# Patient Record
Sex: MALE | Race: BLACK OR AFRICAN AMERICAN | NOT HISPANIC OR LATINO | Employment: UNEMPLOYED | URBAN - METROPOLITAN AREA
[De-identification: names, ages, dates, MRNs, and addresses within clinical notes are randomized per-mention and may not be internally consistent; named-entity substitution may affect disease eponyms.]

---

## 2019-09-26 ENCOUNTER — OFFICE VISIT (OUTPATIENT)
Dept: FAMILY MEDICINE | Facility: CLINIC | Age: 24
End: 2019-09-26
Payer: MEDICAID

## 2019-09-26 VITALS
HEART RATE: 70 BPM | DIASTOLIC BLOOD PRESSURE: 82 MMHG | SYSTOLIC BLOOD PRESSURE: 118 MMHG | OXYGEN SATURATION: 98 % | WEIGHT: 262.44 LBS | BODY MASS INDEX: 34.78 KG/M2 | HEIGHT: 73 IN | TEMPERATURE: 98 F

## 2019-09-26 DIAGNOSIS — Z00.00 GENERAL MEDICAL EXAM: Primary | ICD-10-CM

## 2019-09-26 DIAGNOSIS — Z23 IMMUNIZATION DUE: ICD-10-CM

## 2019-09-26 DIAGNOSIS — M22.2X9 PATELLOFEMORAL STRESS SYNDROME, UNSPECIFIED LATERALITY: ICD-10-CM

## 2019-09-26 DIAGNOSIS — J20.8 VIRAL BRONCHITIS: ICD-10-CM

## 2019-09-26 DIAGNOSIS — F41.9 ANXIETY: ICD-10-CM

## 2019-09-26 PROCEDURE — 99213 OFFICE O/P EST LOW 20 MIN: CPT | Mod: PBBFAC,PN | Performed by: FAMILY MEDICINE

## 2019-09-26 PROCEDURE — 99203 OFFICE O/P NEW LOW 30 MIN: CPT | Mod: S$PBB,,, | Performed by: FAMILY MEDICINE

## 2019-09-26 PROCEDURE — 99203 PR OFFICE/OUTPT VISIT, NEW, LEVL III, 30-44 MIN: ICD-10-PCS | Mod: S$PBB,,, | Performed by: FAMILY MEDICINE

## 2019-09-26 PROCEDURE — 99999 PR PBB SHADOW E&M-EST. PATIENT-LVL III: ICD-10-PCS | Mod: PBBFAC,,, | Performed by: FAMILY MEDICINE

## 2019-09-26 PROCEDURE — 99999 PR PBB SHADOW E&M-EST. PATIENT-LVL III: CPT | Mod: PBBFAC,,, | Performed by: FAMILY MEDICINE

## 2019-09-26 RX ORDER — NABUMETONE 500 MG/1
500 TABLET, FILM COATED ORAL 2 TIMES DAILY
Qty: 30 TABLET | Refills: 1 | Status: SHIPPED | OUTPATIENT
Start: 2019-09-26

## 2019-09-26 RX ORDER — ZIPRASIDONE HYDROCHLORIDE 60 MG/1
CAPSULE ORAL
Refills: 2 | COMMUNITY
Start: 2019-08-29

## 2019-09-26 RX ORDER — BUPROPION HYDROCHLORIDE 300 MG/1
300 TABLET ORAL EVERY MORNING
Refills: 2 | COMMUNITY
Start: 2019-09-12

## 2019-09-26 RX ORDER — BENZONATATE 200 MG/1
200 CAPSULE ORAL 3 TIMES DAILY PRN
Qty: 30 CAPSULE | Refills: 1 | Status: SHIPPED | OUTPATIENT
Start: 2019-09-26 | End: 2019-10-06

## 2019-09-26 NOTE — PROGRESS NOTES
Assessment and Plan:    1. Anxiety  Follow-up with Psychiatry as directed  - TSH; Future  - T3, free; Future  - T4, free; Future    2. General medical exam  Patient defers lab work  - CBC auto differential; Future  - Comprehensive metabolic panel; Future  - Lipid panel; Future    3. Immunization due  Patient defers immunizations    4. Viral bronchitis  Recommended Mucinex  - benzonatate (TESSALON) 200 MG capsule; Take 1 capsule (200 mg total) by mouth 3 (three) times daily as needed for Cough.  Dispense: 30 capsule; Refill: 1    5. Patellofemoral stress syndrome, unspecified laterality  Gave sheet with exercises  - nabumetone (RELAFEN) 500 MG tablet; Take 1 tablet (500 mg total) by mouth 2 (two) times daily.  Dispense: 30 tablet; Refill: 1        ______________________________________________________________________  Subjective:    Chief Complaint:  Chief Complaint   Patient presents with    [facility]    Nasal Congestion     Ocurring x4-5 weeks, cough with phelgm production of brownish yellow, fever and chills, scratchy throat, and no runny nose.     Knee Pain     Chronic pain, patient runs a lot. Hurts mainly with activity.         HPI:  Konrad is a 24 y.o. year old     Nasal congestion  Duration 2 weeks.  Associated with cough and phlegm production.  He does report fever and chills as well as scratchy throat.  Denies any running nose.  Multiple people sick in house.     Knee pain  Chronic.  Pain hurts with running. Exam was unremarkable.     Anxiety, bipolar 1 disorder   Prescribed clonazepam 2 mg by mouth 3 times daily by provider in [place], Dr. Jose Martin Ram.  Previous prescription written on 07/31/2019 and filled on 09/03/2019 for 30 day supply. Also taking Wellbutrin.      Health maintenance  Influenza vaccine - defers  Pneumonia vaccine   Tdap       Past Medical History:  Past Medical History:   Diagnosis Date    Anxiety     Bipolar 1 disorder     Depression        Past Surgical  History:  No past surgical history on file.    Family History:  No family history on file.    Social History:  Social History     Socioeconomic History    Marital status:      Spouse name: Not on file    Number of children: Not on file    Years of education: Not on file    Highest education level: Not on file   Occupational History    Not on file   Social Needs    Financial resource strain: Not on file    Food insecurity:     Worry: Not on file     Inability: Not on file    Transportation needs:     Medical: Not on file     Non-medical: Not on file   Tobacco Use    Smoking status: Current Every Day Smoker     Types: Vaping with nicotine    Smokeless tobacco: Never Used   Substance and Sexual Activity    Alcohol use: Yes     Comment: social    Drug use: No    Sexual activity: Not on file   Lifestyle    Physical activity:     Days per week: Not on file     Minutes per session: Not on file    Stress: Not on file   Relationships    Social connections:     Talks on phone: Not on file     Gets together: Not on file     Attends L[employer] day service: Not on file     Active member of club or organization: Not on file     Attends meetings of clubs or organizations: Not on file     Relationship status: Not on file   Other Topics Concern    Not on file   Social History Narrative    Not on file       Medications:  Current Outpatient Medications on File Prior to Visit   Medication Sig Dispense Refill    buPROPion (WELLBUTRIN XL) 300 MG 24 hr tablet Take 300 mg by mouth every morning.  2    clonazePAM (KLONOPIN) 1 MG tablet Take 2 mg by mouth 3 (three) times daily.       ziprasidone (GEODON) 60 MG Cap TAKE 2 CAPSULES BY MOUTH NIGHTLY AT BEDTIME  2    [DISCONTINUED] multivitamin capsule Take 1 capsule by mouth once daily.      [DISCONTINUED] traZODone (DESYREL) 100 MG tablet Take 100 mg by mouth every evening.       No current facility-administered medications on file prior to visit.   "      Allergies:  Penicillins    Immunizations:    There is no immunization history on file for this patient.    Review of Systems:  Review of Systems   Constitutional: Negative for fever.   HENT: Positive for congestion and sore throat.    Respiratory: Positive for cough. Negative for shortness of breath.    Cardiovascular: Negative for chest pain.   Gastrointestinal: Negative for abdominal pain, diarrhea, nausea and vomiting.   Skin: Negative for rash.   Psychiatric/Behavioral: Negative for dysphoric mood.       Objective:    Vitals:  Vitals:    09/26/19 1051   BP: 118/82   Pulse: 70   Temp: 97.9 °F (36.6 °C)   TempSrc: Oral   SpO2: 98%   Weight: 119 kg (262 lb 7.3 oz)   Height: 6' 1" (1.854 m)   PainSc: 0-No pain       Physical Exam   Constitutional: No distress.   HENT:   Head: Normocephalic and atraumatic.   Nose: Mucosal edema and rhinorrhea present.   Eyes: Pupils are equal, round, and reactive to light. EOM are normal.   Neck: Neck supple.   Cardiovascular: Normal rate and regular rhythm. Exam reveals no friction rub.   No murmur heard.  Pulmonary/Chest: Effort normal and breath sounds normal.   Abdominal: Soft. Bowel sounds are normal. He exhibits no distension. There is no tenderness.   Skin: Skin is warm and dry. No rash noted.   Psychiatric: He has a normal mood and affect. His behavior is normal.       Data:  Previous Prescription monitoring program reviewed and pertinent for Clonazepam prescribing habits.        Juan Lopez MD  Family Medicine    "

## 2024-11-19 ENCOUNTER — HOSPITAL ENCOUNTER (EMERGENCY)
Facility: HOSPITAL | Age: 29
Discharge: PSYCHIATRIC OR SUBSTANCE USE FACILITY | End: 2024-11-19
Attending: EMERGENCY MEDICINE
Payer: MEDICARE

## 2024-11-19 VITALS
SYSTOLIC BLOOD PRESSURE: 133 MMHG | BODY MASS INDEX: 34.43 KG/M2 | HEIGHT: 74 IN | HEART RATE: 90 BPM | DIASTOLIC BLOOD PRESSURE: 74 MMHG | WEIGHT: 268.31 LBS | OXYGEN SATURATION: 100 % | RESPIRATION RATE: 18 BRPM | TEMPERATURE: 98 F

## 2024-11-19 DIAGNOSIS — Z00.8 MEDICAL CLEARANCE FOR PSYCHIATRIC ADMISSION: Primary | ICD-10-CM

## 2024-11-19 DIAGNOSIS — F31.9 BIPOLAR AFFECTIVE DISORDER, REMISSION STATUS UNSPECIFIED: ICD-10-CM

## 2024-11-19 LAB
ALBUMIN SERPL BCP-MCNC: 4.7 G/DL (ref 3.5–5.2)
ALP SERPL-CCNC: 53 U/L (ref 55–135)
ALT SERPL W/O P-5'-P-CCNC: 14 U/L (ref 10–44)
AMPHET+METHAMPHET UR QL: NEGATIVE
ANION GAP SERPL CALC-SCNC: 6 MMOL/L (ref 8–16)
APAP SERPL-MCNC: 0.3 UG/ML (ref 10–20)
AST SERPL-CCNC: 20 U/L (ref 10–40)
BARBITURATES UR QL SCN>200 NG/ML: NEGATIVE
BASOPHILS # BLD AUTO: 0.06 K/UL (ref 0–0.2)
BASOPHILS NFR BLD: 0.6 % (ref 0–1.9)
BENZODIAZ UR QL SCN>200 NG/ML: NEGATIVE
BILIRUB SERPL-MCNC: 0.7 MG/DL (ref 0.1–1)
BILIRUB UR QL STRIP: NEGATIVE
BUN SERPL-MCNC: 15 MG/DL (ref 6–20)
BZE UR QL SCN: NEGATIVE
CALCIUM SERPL-MCNC: 9.6 MG/DL (ref 8.7–10.5)
CANNABINOIDS UR QL SCN: ABNORMAL
CHLORIDE SERPL-SCNC: 105 MMOL/L (ref 95–110)
CLARITY UR: CLEAR
CO2 SERPL-SCNC: 28 MMOL/L (ref 23–29)
COLOR UR: YELLOW
CREAT SERPL-MCNC: 1 MG/DL (ref 0.5–1.4)
CREAT UR-MCNC: 334.5 MG/DL (ref 23–375)
DIFFERENTIAL METHOD BLD: ABNORMAL
EOSINOPHIL # BLD AUTO: 0.5 K/UL (ref 0–0.5)
EOSINOPHIL NFR BLD: 4.5 % (ref 0–8)
ERYTHROCYTE [DISTWIDTH] IN BLOOD BY AUTOMATED COUNT: 15.2 % (ref 11.5–14.5)
EST. GFR  (NO RACE VARIABLE): >60 ML/MIN/1.73 M^2
ETHANOL SERPL-MCNC: <10 MG/DL
GLUCOSE SERPL-MCNC: 117 MG/DL (ref 70–110)
GLUCOSE UR QL STRIP: NEGATIVE
HCT VFR BLD AUTO: 41.6 % (ref 40–54)
HGB BLD-MCNC: 13.4 G/DL (ref 14–18)
HGB UR QL STRIP: NEGATIVE
IMM GRANULOCYTES # BLD AUTO: 0.05 K/UL (ref 0–0.04)
IMM GRANULOCYTES NFR BLD AUTO: 0.5 % (ref 0–0.5)
KETONES UR QL STRIP: NEGATIVE
LEUKOCYTE ESTERASE UR QL STRIP: NEGATIVE
LYMPHOCYTES # BLD AUTO: 3 K/UL (ref 1–4.8)
LYMPHOCYTES NFR BLD: 28.6 % (ref 18–48)
MCH RBC QN AUTO: 25.1 PG (ref 27–31)
MCHC RBC AUTO-ENTMCNC: 32.2 G/DL (ref 32–36)
MCV RBC AUTO: 78 FL (ref 82–98)
MONOCYTES # BLD AUTO: 0.8 K/UL (ref 0.3–1)
MONOCYTES NFR BLD: 7.9 % (ref 4–15)
NEUTROPHILS # BLD AUTO: 6.1 K/UL (ref 1.8–7.7)
NEUTROPHILS NFR BLD: 57.9 % (ref 38–73)
NITRITE UR QL STRIP: NEGATIVE
NRBC BLD-RTO: 0 /100 WBC
OPIATES UR QL SCN: NEGATIVE
PCP UR QL SCN>25 NG/ML: NEGATIVE
PH UR STRIP: 7 [PH] (ref 5–8)
PLATELET # BLD AUTO: 263 K/UL (ref 150–450)
PMV BLD AUTO: 10.8 FL (ref 9.2–12.9)
POTASSIUM SERPL-SCNC: 3.7 MMOL/L (ref 3.5–5.1)
PROT SERPL-MCNC: 7.2 G/DL (ref 6–8.4)
PROT UR QL STRIP: ABNORMAL
RBC # BLD AUTO: 5.34 M/UL (ref 4.6–6.2)
SALICYLATES SERPL-MCNC: <1.5 MG/DL (ref 15–30)
SODIUM SERPL-SCNC: 139 MMOL/L (ref 136–145)
SP GR UR STRIP: >1.03 (ref 1–1.03)
TOXICOLOGY INFORMATION: ABNORMAL
TSH SERPL DL<=0.005 MIU/L-ACNC: 1.35 UIU/ML (ref 0.34–5.6)
URN SPEC COLLECT METH UR: ABNORMAL
UROBILINOGEN UR STRIP-ACNC: ABNORMAL EU/DL
WBC # BLD AUTO: 10.53 K/UL (ref 3.9–12.7)

## 2024-11-19 PROCEDURE — 80307 DRUG TEST PRSMV CHEM ANLYZR: CPT | Performed by: EMERGENCY MEDICINE

## 2024-11-19 PROCEDURE — 99285 EMERGENCY DEPT VISIT HI MDM: CPT

## 2024-11-19 PROCEDURE — 86803 HEPATITIS C AB TEST: CPT | Performed by: EMERGENCY MEDICINE

## 2024-11-19 PROCEDURE — 80053 COMPREHEN METABOLIC PANEL: CPT | Performed by: EMERGENCY MEDICINE

## 2024-11-19 PROCEDURE — 80143 DRUG ASSAY ACETAMINOPHEN: CPT | Performed by: EMERGENCY MEDICINE

## 2024-11-19 PROCEDURE — 84443 ASSAY THYROID STIM HORMONE: CPT | Performed by: EMERGENCY MEDICINE

## 2024-11-19 PROCEDURE — 81003 URINALYSIS AUTO W/O SCOPE: CPT | Performed by: EMERGENCY MEDICINE

## 2024-11-19 PROCEDURE — 85025 COMPLETE CBC W/AUTO DIFF WBC: CPT | Performed by: EMERGENCY MEDICINE

## 2024-11-19 PROCEDURE — 82077 ASSAY SPEC XCP UR&BREATH IA: CPT | Performed by: EMERGENCY MEDICINE

## 2024-11-19 PROCEDURE — 80179 DRUG ASSAY SALICYLATE: CPT | Performed by: EMERGENCY MEDICINE

## 2024-11-19 PROCEDURE — 87389 HIV-1 AG W/HIV-1&-2 AB AG IA: CPT | Performed by: EMERGENCY MEDICINE

## 2024-11-20 LAB
HCV AB SERPL QL IA: NEGATIVE
HIV 1+2 AB+HIV1 P24 AG SERPL QL IA: NEGATIVE

## 2024-11-20 NOTE — ED PROVIDER NOTES
Encounter Date: 11/19/2024       History     Chief Complaint   Patient presents with    Mental Health Problem     Here for opc     This is a 29-year-old male with a history of bipolar, anxiety, depression, presents emergency department under OPC.  OPC written by mother for paranoia, delusions, aggressive behavior.  Patient was just released from psychiatric hospital in [place].  Patient has not been taking his bipolar medication.  Mother states he has been aggressive towards her.       Review of patient's allergies indicates:   Allergen Reactions    Penicillins      Past Medical History:   Diagnosis Date    Anxiety     Bipolar 1 disorder     Depression      No past surgical history on file.  No family history on file.  Social History     Tobacco Use    Smoking status: Every Day     Types: Vaping with nicotine, Vaping w/o nicotine    Smokeless tobacco: Never   Substance Use Topics    Alcohol use: Yes     Comment: social    Drug use: Yes     Types: Marijuana     Review of Systems   Unable to perform ROS: Psychiatric disorder       Physical Exam     Initial Vitals [11/19/24 2010]   BP Pulse Resp Temp SpO2   (!) 130/59 86 18 98.1 °F (36.7 °C) 100 %      MAP       --         Physical Exam    Nursing note and vitals reviewed.  Constitutional: He appears well-developed and well-nourished. He is not diaphoretic. No distress.   HENT:   Head: Normocephalic and atraumatic. Mouth/Throat: Oropharynx is clear and moist. No oropharyngeal exudate.   Eyes: Conjunctivae and EOM are normal. Pupils are equal, round, and reactive to light.   Neck: No tracheal deviation present.   Cardiovascular:  Normal rate, regular rhythm, normal heart sounds and intact distal pulses.           No murmur heard.  Pulmonary/Chest: Breath sounds normal. No stridor. No respiratory distress. He has no wheezes. He has no rhonchi. He has no rales.   Abdominal: Abdomen is soft. Bowel sounds are normal. He exhibits no distension. There is no abdominal  tenderness. There is no rebound and no guarding.   Musculoskeletal:         General: No tenderness or edema. Normal range of motion.     Neurological: He is alert and oriented to person, place, and time. He has normal strength. No cranial nerve deficit or sensory deficit.   Skin: Skin is warm and dry. Capillary refill takes less than 2 seconds. No rash noted. No erythema. No pallor.   Psychiatric: He has a normal mood and affect. His behavior is normal. His speech is rapid and/or pressured and tangential. He is not actively hallucinating. Thought content is paranoid. Thought content is not delusional. He expresses no homicidal and no suicidal ideation.         ED Course   Procedures  Labs Reviewed   CBC W/ AUTO DIFFERENTIAL - Abnormal       Result Value    WBC 10.53      RBC 5.34      Hemoglobin 13.4 (*)     Hematocrit 41.6      MCV 78 (*)     MCH 25.1 (*)     MCHC 32.2      RDW 15.2 (*)     Platelets 263      MPV 10.8      Immature Granulocytes 0.5      Gran # (ANC) 6.1      Immature Grans (Abs) 0.05 (*)     Lymph # 3.0      Mono # 0.8      Eos # 0.5      Baso # 0.06      nRBC 0      Gran % 57.9      Lymph % 28.6      Mono % 7.9      Eosinophil % 4.5      Basophil % 0.6      Differential Method Automated      Narrative:     Release to patient->Immediate   COMPREHENSIVE METABOLIC PANEL - Abnormal    Sodium 139      Potassium 3.7      Chloride 105      CO2 28      Glucose 117 (*)     BUN 15      Creatinine 1.0      Calcium 9.6      Total Protein 7.2      Albumin 4.7      Total Bilirubin 0.7      Alkaline Phosphatase 53 (*)     AST 20      ALT 14      eGFR >60.0      Anion Gap 6 (*)     Narrative:     Release to patient->Immediate   URINALYSIS, REFLEX TO URINE CULTURE - Abnormal    Specimen UA Urine, Clean Catch      Color, UA Yellow      Appearance, UA Clear      pH, UA 7.0      Specific Gravity, UA >1.030 (*)     Protein, UA Trace (*)     Glucose, UA Negative      Ketones, UA Negative      Bilirubin (UA) Negative       Occult Blood UA Negative      Nitrite, UA Negative      Urobilinogen, UA 2.0-3.0 (*)     Leukocytes, UA Negative      Narrative:     Specimen Source->Urine   DRUG SCREEN PANEL, URINE EMERGENCY - Abnormal    Benzodiazepines Negative      Cocaine (Metab.) Negative      Opiate Scrn, Ur Negative      Barbiturate Screen, Ur Negative      Amphetamine Screen, Ur Negative      THC Presumptive Positive (*)     Phencyclidine Negative      Toxicology Information SEE COMMENT      Narrative:     Specimen Source->Urine   ACETAMINOPHEN LEVEL - Abnormal    Acetaminophen (Tylenol), Serum 0.3 (*)     Narrative:     Release to patient->Immediate   TSH    TSH 1.354      Narrative:     Release to patient->Immediate   ALCOHOL,MEDICAL (ETHANOL)    Alcohol, Serum <10      Narrative:     Release to patient->Immediate   HEPATITIS C ANTIBODY   HIV 1 / 2 ANTIBODY   SALICYLATE LEVEL          Imaging Results    None          Medications - No data to display  Medical Decision Making  Differential includes but not limited to psychosis, medication noncompliance, electrolyte abnormality, dehydration,    Emergent evaluation of a 29-year-old male presents emergency department under OPC.  Patient has had pec filled out by myself for grave disability.  Patient is off his bipolar medication, he has been aggressive towards his mother.  He has been delusional per mother.  Patient needs inpatient therapy again to get back on medication and further stabilization of his mood.  Patient is medically clear for transport to a psychiatric facility.    A dictation software program was used for this note.  Please expect some simple typographical  errors in this note.     Amount and/or Complexity of Data Reviewed  External Data Reviewed: labs and notes.  Labs: ordered. Decision-making details documented in ED Course.  Radiology: ordered and independent interpretation performed. Decision-making details documented in ED Course.  ECG/medicine tests: ordered and  independent interpretation performed. Decision-making details documented in ED Course.    Risk  OTC drugs.  Prescription drug management.  Decision regarding hospitalization.                  Medically cleared for psychiatry placement: 11/19/2024  8:52 PM                   Clinical Impression:  Final diagnoses:  [Z00.8] Medical clearance for psychiatric admission (Primary)  [F31.9] Bipolar affective disorder, remission status unspecified          ED Disposition Condition    Transfer to Psych Facility Stable          ED Prescriptions    None       Follow-up Information    None          Jose Hughes DO  11/19/24 2052

## 2025-02-24 ENCOUNTER — HOSPITAL ENCOUNTER (EMERGENCY)
Facility: HOSPITAL | Age: 30
Discharge: PSYCHIATRIC OR SUBSTANCE USE FACILITY | End: 2025-02-24
Attending: EMERGENCY MEDICINE
Payer: MEDICARE

## 2025-02-24 VITALS
SYSTOLIC BLOOD PRESSURE: 121 MMHG | OXYGEN SATURATION: 96 % | HEART RATE: 84 BPM | WEIGHT: 268 LBS | BODY MASS INDEX: 34.39 KG/M2 | DIASTOLIC BLOOD PRESSURE: 57 MMHG | TEMPERATURE: 99 F | HEIGHT: 74 IN | RESPIRATION RATE: 16 BRPM

## 2025-02-24 DIAGNOSIS — R45.850 HOMICIDAL IDEATIONS: Primary | ICD-10-CM

## 2025-02-24 DIAGNOSIS — F31.9 BIPOLAR DISORDER WITH PSYCHOTIC FEATURES: ICD-10-CM

## 2025-02-24 LAB
ALBUMIN SERPL BCP-MCNC: 4.7 G/DL (ref 3.5–5.2)
ALP SERPL-CCNC: 61 U/L (ref 40–150)
ALT SERPL W/O P-5'-P-CCNC: 17 U/L (ref 10–44)
AMPHET+METHAMPHET UR QL: NEGATIVE
ANION GAP SERPL CALC-SCNC: 11 MMOL/L (ref 8–16)
APAP SERPL-MCNC: <3 UG/ML (ref 10–20)
AST SERPL-CCNC: 23 U/L (ref 10–40)
BACTERIA #/AREA URNS HPF: NORMAL /HPF
BARBITURATES UR QL SCN>200 NG/ML: NEGATIVE
BASOPHILS # BLD AUTO: 0.04 K/UL (ref 0–0.2)
BASOPHILS NFR BLD: 0.6 % (ref 0–1.9)
BENZODIAZ UR QL SCN>200 NG/ML: NEGATIVE
BILIRUB SERPL-MCNC: 1.6 MG/DL (ref 0.1–1)
BILIRUB UR QL STRIP: NEGATIVE
BUN SERPL-MCNC: 15 MG/DL (ref 6–20)
BZE UR QL SCN: NEGATIVE
CALCIUM SERPL-MCNC: 9.9 MG/DL (ref 8.7–10.5)
CANNABINOIDS UR QL SCN: ABNORMAL
CHLORIDE SERPL-SCNC: 108 MMOL/L (ref 95–110)
CLARITY UR: ABNORMAL
CO2 SERPL-SCNC: 23 MMOL/L (ref 23–29)
COLOR UR: ABNORMAL
CREAT SERPL-MCNC: 1.1 MG/DL (ref 0.5–1.4)
CREAT UR-MCNC: 298.6 MG/DL (ref 23–375)
DIFFERENTIAL METHOD BLD: ABNORMAL
EOSINOPHIL # BLD AUTO: 0.1 K/UL (ref 0–0.5)
EOSINOPHIL NFR BLD: 2 % (ref 0–8)
ERYTHROCYTE [DISTWIDTH] IN BLOOD BY AUTOMATED COUNT: 14.9 % (ref 11.5–14.5)
EST. GFR  (NO RACE VARIABLE): >60 ML/MIN/1.73 M^2
ETHANOL SERPL-MCNC: <10 MG/DL
GLUCOSE SERPL-MCNC: 109 MG/DL (ref 70–110)
GLUCOSE UR QL STRIP: NEGATIVE
HCT VFR BLD AUTO: 43 % (ref 40–54)
HGB BLD-MCNC: 13.6 G/DL (ref 14–18)
HGB UR QL STRIP: ABNORMAL
HYALINE CASTS #/AREA URNS LPF: 0 /LPF
IMM GRANULOCYTES # BLD AUTO: 0.03 K/UL (ref 0–0.04)
IMM GRANULOCYTES NFR BLD AUTO: 0.4 % (ref 0–0.5)
KETONES UR QL STRIP: NEGATIVE
LEUKOCYTE ESTERASE UR QL STRIP: ABNORMAL
LYMPHOCYTES # BLD AUTO: 1.8 K/UL (ref 1–4.8)
LYMPHOCYTES NFR BLD: 25.7 % (ref 18–48)
MCH RBC QN AUTO: 24.2 PG (ref 27–31)
MCHC RBC AUTO-ENTMCNC: 31.6 G/DL (ref 32–36)
MCV RBC AUTO: 77 FL (ref 82–98)
METHADONE UR QL SCN>300 NG/ML: NEGATIVE
MICROSCOPIC COMMENT: NORMAL
MONOCYTES # BLD AUTO: 0.5 K/UL (ref 0.3–1)
MONOCYTES NFR BLD: 6.4 % (ref 4–15)
NEUTROPHILS # BLD AUTO: 4.6 K/UL (ref 1.8–7.7)
NEUTROPHILS NFR BLD: 64.9 % (ref 38–73)
NITRITE UR QL STRIP: NEGATIVE
NRBC BLD-RTO: 0 /100 WBC
OPIATES UR QL SCN: NEGATIVE
PCP UR QL SCN>25 NG/ML: NEGATIVE
PH UR STRIP: 6 [PH] (ref 5–8)
PLATELET # BLD AUTO: 261 K/UL (ref 150–450)
PMV BLD AUTO: 11.2 FL (ref 9.2–12.9)
POTASSIUM SERPL-SCNC: 4.1 MMOL/L (ref 3.5–5.1)
PROT SERPL-MCNC: 8.2 G/DL (ref 6–8.4)
PROT UR QL STRIP: ABNORMAL
RBC # BLD AUTO: 5.62 M/UL (ref 4.6–6.2)
RBC #/AREA URNS HPF: 0 /HPF (ref 0–4)
SALICYLATES SERPL-MCNC: <5 MG/DL (ref 15–30)
SARS-COV-2 RDRP RESP QL NAA+PROBE: NEGATIVE
SODIUM SERPL-SCNC: 142 MMOL/L (ref 136–145)
SP GR UR STRIP: 1.02 (ref 1–1.03)
TOXICOLOGY INFORMATION: ABNORMAL
URN SPEC COLLECT METH UR: ABNORMAL
UROBILINOGEN UR STRIP-ACNC: NEGATIVE EU/DL
WBC # BLD AUTO: 7.08 K/UL (ref 3.9–12.7)
WBC #/AREA URNS HPF: 0 /HPF (ref 0–5)

## 2025-02-24 PROCEDURE — 99285 EMERGENCY DEPT VISIT HI MDM: CPT | Mod: 25

## 2025-02-24 PROCEDURE — 80143 DRUG ASSAY ACETAMINOPHEN: CPT | Performed by: EMERGENCY MEDICINE

## 2025-02-24 PROCEDURE — 80179 DRUG ASSAY SALICYLATE: CPT | Performed by: EMERGENCY MEDICINE

## 2025-02-24 PROCEDURE — 85025 COMPLETE CBC W/AUTO DIFF WBC: CPT | Performed by: EMERGENCY MEDICINE

## 2025-02-24 PROCEDURE — 80053 COMPREHEN METABOLIC PANEL: CPT | Performed by: EMERGENCY MEDICINE

## 2025-02-24 PROCEDURE — 87635 SARS-COV-2 COVID-19 AMP PRB: CPT | Performed by: EMERGENCY MEDICINE

## 2025-02-24 PROCEDURE — 81000 URINALYSIS NONAUTO W/SCOPE: CPT | Mod: 59 | Performed by: EMERGENCY MEDICINE

## 2025-02-24 PROCEDURE — 80307 DRUG TEST PRSMV CHEM ANLYZR: CPT | Performed by: EMERGENCY MEDICINE

## 2025-02-24 PROCEDURE — 96372 THER/PROPH/DIAG INJ SC/IM: CPT | Performed by: EMERGENCY MEDICINE

## 2025-02-24 PROCEDURE — 63600175 PHARM REV CODE 636 W HCPCS: Performed by: EMERGENCY MEDICINE

## 2025-02-24 PROCEDURE — 36415 COLL VENOUS BLD VENIPUNCTURE: CPT | Performed by: EMERGENCY MEDICINE

## 2025-02-24 PROCEDURE — 82077 ASSAY SPEC XCP UR&BREATH IA: CPT | Performed by: EMERGENCY MEDICINE

## 2025-02-24 RX ORDER — DIPHENHYDRAMINE HYDROCHLORIDE 50 MG/ML
50 INJECTION INTRAMUSCULAR; INTRAVENOUS
Status: COMPLETED | OUTPATIENT
Start: 2025-02-24 | End: 2025-02-24

## 2025-02-24 RX ORDER — HALOPERIDOL 5 MG/ML
5 INJECTION INTRAMUSCULAR
Status: COMPLETED | OUTPATIENT
Start: 2025-02-24 | End: 2025-02-24

## 2025-02-24 RX ORDER — LORAZEPAM 2 MG/ML
2 INJECTION INTRAMUSCULAR
Status: COMPLETED | OUTPATIENT
Start: 2025-02-24 | End: 2025-02-24

## 2025-02-24 RX ADMIN — DIPHENHYDRAMINE HYDROCHLORIDE 50 MG: 50 INJECTION INTRAMUSCULAR; INTRAVENOUS at 10:02

## 2025-02-24 RX ADMIN — LORAZEPAM 2 MG: 2 INJECTION INTRAMUSCULAR; INTRAVENOUS at 10:02

## 2025-02-24 RX ADMIN — HALOPERIDOL LACTATE 5 MG: 5 INJECTION, SOLUTION INTRAMUSCULAR at 10:02

## 2025-02-24 NOTE — ED PROVIDER NOTES
Encounter Date: 2/24/2025       History     Chief Complaint   Patient presents with    mental Health Evaluation     Mom called due to Manic Episode, mom called police, patient's uncle stated he threatened to kill them, patient has hx bipolar, patient noncompliant on meds     Patient presents emergency department via 's office uncle call after patient has made threats to kill him and his sister who is the patient's mother patient is agitated grandiose with flight of ideas and pressured speech he denies any suicidal ideation he denies any hallucinations he is obviously manic at this time he does admit to having a diagnosed with bipolar disorder but states that was given to him by his mother he states that he treats his bipolar disorder with marijuana  Patient is apparently noncompliant with his medications        Review of patient's allergies indicates:   Allergen Reactions    Penicillins      Past Medical History:   Diagnosis Date    Anxiety     Bipolar 1 disorder     Depression      No past surgical history on file.  No family history on file.  Social History[1]  Review of Systems   Unable to perform ROS: Psychiatric disorder       Physical Exam     Initial Vitals [02/24/25 1005]   BP Pulse Resp Temp SpO2   137/79 83 20 98.9 °F (37.2 °C) 98 %      MAP       --         Physical Exam    Constitutional: He appears well-developed and well-nourished. No distress.   HENT:   Head: Normocephalic and atraumatic.   Right Ear: External ear normal.   Left Ear: External ear normal. Mouth/Throat: Oropharynx is clear and moist.   Eyes: Pupils are equal, round, and reactive to light.   Neck: Neck supple.   Normal range of motion.  Cardiovascular:  Normal rate, regular rhythm, S1 normal, S2 normal and intact distal pulses.           Abdominal: Abdomen is soft. Bowel sounds are normal. There is no abdominal tenderness.   Musculoskeletal:         General: Normal range of motion.      Cervical back: Normal range of motion and  neck supple.     Neurological: He is alert and oriented to person, place, and time. He has normal strength. GCS score is 15. GCS eye subscore is 4. GCS verbal subscore is 5. GCS motor subscore is 6.   Skin: Skin is warm and dry. No rash noted.   Psychiatric: His affect is labile and inappropriate. His speech is rapid and/or pressured and tangential. He is agitated. He is not actively hallucinating. Thought content is paranoid and delusional. Cognition and memory are normal. He expresses impulsivity and inappropriate judgment. He expresses homicidal ideation. He expresses no suicidal ideation. He is attentive.         ED Course   Procedures  Labs Reviewed   CBC W/ AUTO DIFFERENTIAL   COMPREHENSIVE METABOLIC PANEL   URINALYSIS, REFLEX TO URINE CULTURE   DRUG SCREEN PANEL, URINE EMERGENCY   ALCOHOL,MEDICAL (ETHANOL)   ACETAMINOPHEN LEVEL   SARS-COV-2 RNA AMPLIFICATION, QUAL   SALICYLATE LEVEL          Imaging Results    None          Medications   haloperidol lactate injection 5 mg (5 mg Intramuscular Given 2/24/25 1016)   LORazepam injection 2 mg (2 mg Intramuscular Given 2/24/25 1015)   diphenhydrAMINE injection 50 mg (50 mg Intramuscular Given 2/24/25 1016)     Medical Decision Making  PEC completed  Patient medically clear and stable for inpatient psychiatric treatment    Amount and/or Complexity of Data Reviewed  Labs: ordered.    Risk  Prescription drug management.                  Medically cleared for psychiatry placement: 2/24/2025 10:20 AM                   Clinical Impression:  Final diagnoses:  [R45.850] Homicidal ideations (Primary)  [F31.9] Bipolar disorder with psychotic features          ED Disposition Condition    Transfer to Psych Facility Stable          ED Prescriptions    None       Follow-up Information    None            [1]   Social History  Tobacco Use    Smoking status: Every Day     Types: Vaping with nicotine, Vaping w/o nicotine    Smokeless tobacco: Never   Substance Use Topics    Alcohol  use: Yes     Comment: social    Drug use: Yes     Types: Marijuana        Zain Bobby MD  02/24/25 8122

## 2025-07-05 ENCOUNTER — HOSPITAL ENCOUNTER (EMERGENCY)
Facility: HOSPITAL | Age: 30
Discharge: PSYCHIATRIC OR SUBSTANCE USE FACILITY | End: 2025-07-05
Attending: EMERGENCY MEDICINE
Payer: MEDICARE

## 2025-07-05 VITALS
RESPIRATION RATE: 21 BRPM | SYSTOLIC BLOOD PRESSURE: 129 MMHG | DIASTOLIC BLOOD PRESSURE: 83 MMHG | BODY MASS INDEX: 23.1 KG/M2 | TEMPERATURE: 98 F | WEIGHT: 180 LBS | HEART RATE: 99 BPM | HEIGHT: 74 IN | OXYGEN SATURATION: 97 %

## 2025-07-05 DIAGNOSIS — F30.9 MANIA: Primary | ICD-10-CM

## 2025-07-05 DIAGNOSIS — Z00.8 MEDICAL CLEARANCE FOR PSYCHIATRIC ADMISSION: ICD-10-CM

## 2025-07-05 DIAGNOSIS — F31.9 BIPOLAR AFFECTIVE DISORDER, REMISSION STATUS UNSPECIFIED: ICD-10-CM

## 2025-07-05 LAB
ABSOLUTE EOSINOPHIL (SMH): 0.26 K/UL
ABSOLUTE MONOCYTE (SMH): 0.48 K/UL (ref 0.3–1)
ABSOLUTE NEUTROPHIL COUNT (SMH): 5 K/UL (ref 1.8–7.7)
ALBUMIN SERPL-MCNC: 5.1 G/DL (ref 3.5–5.2)
ALP SERPL-CCNC: 71 UNIT/L (ref 55–135)
ALT SERPL-CCNC: 11 UNIT/L (ref 10–44)
AMPHET UR QL SCN: NEGATIVE
ANION GAP (SMH): 1 MMOL/L (ref 8–16)
APAP SERPL-MCNC: 0.1 UG/ML (ref 10–20)
AST SERPL-CCNC: 19 UNIT/L (ref 10–40)
BARBITURATE SCN PRESENT UR: NEGATIVE
BASOPHILS # BLD AUTO: 0.05 K/UL
BASOPHILS NFR BLD AUTO: 0.6 %
BENZODIAZ UR QL SCN: NEGATIVE
BILIRUB SERPL-MCNC: 0.8 MG/DL (ref 0.1–1)
BILIRUB UR QL STRIP.AUTO: NEGATIVE
BUN SERPL-MCNC: 10 MG/DL (ref 6–20)
CALCIUM SERPL-MCNC: 9.9 MG/DL (ref 8.7–10.5)
CANNABINOIDS UR QL SCN: NEGATIVE
CHLORIDE SERPL-SCNC: 108 MMOL/L (ref 95–110)
CLARITY UR: CLEAR
CO2 SERPL-SCNC: 30 MMOL/L (ref 23–29)
COCAINE UR QL SCN: NEGATIVE
COLOR UR AUTO: YELLOW
CREAT SERPL-MCNC: 1 MG/DL (ref 0.5–1.4)
CREAT UR-MCNC: 173.1 MG/DL (ref 23–375)
ERYTHROCYTE [DISTWIDTH] IN BLOOD BY AUTOMATED COUNT: 15.1 % (ref 11.5–14.5)
ETHANOL SERPL-MCNC: <10 MG/DL
GFR SERPLBLD CREATININE-BSD FMLA CKD-EPI: >60 ML/MIN/1.73/M2
GLUCOSE SERPL-MCNC: 98 MG/DL (ref 70–110)
GLUCOSE UR QL STRIP: NEGATIVE
HCT VFR BLD AUTO: 43.8 % (ref 40–54)
HCV AB SERPL QL IA: NORMAL
HGB BLD-MCNC: 13.8 GM/DL (ref 14–18)
HGB UR QL STRIP: NEGATIVE
HIV 1+2 AB+HIV1 P24 AG SERPL QL IA: NORMAL
IMM GRANULOCYTES # BLD AUTO: 0.04 K/UL (ref 0–0.04)
IMM GRANULOCYTES NFR BLD AUTO: 0.5 % (ref 0–0.5)
KETONES UR QL STRIP: NEGATIVE
LEUKOCYTE ESTERASE UR QL STRIP: NEGATIVE
LYMPHOCYTES # BLD AUTO: 2.67 K/UL (ref 1–4.8)
MCH RBC QN AUTO: 24 PG (ref 27–31)
MCHC RBC AUTO-ENTMCNC: 31.5 G/DL (ref 32–36)
MCV RBC AUTO: 76 FL (ref 82–98)
NITRITE UR QL STRIP: NEGATIVE
NUCLEATED RBC (/100WBC) (SMH): 0 /100 WBC
OPIATES UR QL SCN: NEGATIVE
PCP UR QL: NEGATIVE
PH UR STRIP: 8 [PH]
PLATELET # BLD AUTO: 261 K/UL (ref 150–450)
PMV BLD AUTO: 11.2 FL (ref 9.2–12.9)
POTASSIUM SERPL-SCNC: 3.7 MMOL/L (ref 3.5–5.1)
PROT SERPL-MCNC: 8.3 GM/DL (ref 6–8.4)
PROT UR QL STRIP: NEGATIVE
RBC # BLD AUTO: 5.76 M/UL (ref 4.6–6.2)
RELATIVE EOSINOPHIL (SMH): 3.1 % (ref 0–8)
RELATIVE LYMPHOCYTE (SMH): 31.4 % (ref 18–48)
RELATIVE MONOCYTE (SMH): 5.7 % (ref 4–15)
RELATIVE NEUTROPHIL (SMH): 58.7 % (ref 38–73)
SALICYLATES SERPL-MCNC: <1.5 MG/DL (ref 15–30)
SODIUM SERPL-SCNC: 139 MMOL/L (ref 136–145)
SP GR UR STRIP: 1.02
TSH SERPL-ACNC: 0.83 UIU/ML (ref 0.34–5.6)
UROBILINOGEN UR STRIP-ACNC: NEGATIVE EU/DL
WBC # BLD AUTO: 8.49 K/UL (ref 3.9–12.7)

## 2025-07-05 PROCEDURE — 81003 URINALYSIS AUTO W/O SCOPE: CPT | Performed by: EMERGENCY MEDICINE

## 2025-07-05 PROCEDURE — 87389 HIV-1 AG W/HIV-1&-2 AB AG IA: CPT | Performed by: EMERGENCY MEDICINE

## 2025-07-05 PROCEDURE — 84443 ASSAY THYROID STIM HORMONE: CPT | Performed by: EMERGENCY MEDICINE

## 2025-07-05 PROCEDURE — 82077 ASSAY SPEC XCP UR&BREATH IA: CPT | Performed by: EMERGENCY MEDICINE

## 2025-07-05 PROCEDURE — 80179 DRUG ASSAY SALICYLATE: CPT | Performed by: EMERGENCY MEDICINE

## 2025-07-05 PROCEDURE — 86803 HEPATITIS C AB TEST: CPT | Performed by: EMERGENCY MEDICINE

## 2025-07-05 PROCEDURE — 85025 COMPLETE CBC W/AUTO DIFF WBC: CPT | Performed by: EMERGENCY MEDICINE

## 2025-07-05 PROCEDURE — 80307 DRUG TEST PRSMV CHEM ANLYZR: CPT | Performed by: EMERGENCY MEDICINE

## 2025-07-05 PROCEDURE — 80143 DRUG ASSAY ACETAMINOPHEN: CPT | Performed by: EMERGENCY MEDICINE

## 2025-07-05 PROCEDURE — 99285 EMERGENCY DEPT VISIT HI MDM: CPT

## 2025-07-05 PROCEDURE — 80053 COMPREHEN METABOLIC PANEL: CPT | Performed by: EMERGENCY MEDICINE

## 2025-07-05 RX ORDER — LORAZEPAM 1 MG/1
2 TABLET ORAL
Status: DISCONTINUED | OUTPATIENT
Start: 2025-07-05 | End: 2025-07-05 | Stop reason: HOSPADM

## 2025-07-05 NOTE — ED PROVIDER NOTES
Chief complaint:  Mental Health Problem (Patient OPC by family, hx schizophrenia and bipolar. )      HPI:  Konrad Millard is a 30 y.o. male with a history of bipolar disorder and similar presentation in the past presenting with OPC by family member for patient allegedly threatening to kill himself and his family and aggressive behavior towards family members including son.  Patient denies all allegations, then in course of express thought, shares that he is being monitored by the government and feels his family has wrong them with marital issues due to wife's disobedience and neglect.    ROS: As per HPI and below:  No headache, fever, chest pain, dyspnea, abdominal pain, seizure, recent alcohol or drug use.    Review of patient's allergies indicates:   Allergen Reactions    Amoxicillin Swelling    Penicillins        Patient's Medications   New Prescriptions    No medications on file   Previous Medications    BUPROPION (WELLBUTRIN XL) 300 MG 24 HR TABLET    Take 300 mg by mouth every morning.    CLONAZEPAM (KLONOPIN) 1 MG TABLET    Take 2 mg by mouth 3 (three) times daily.     NABUMETONE (RELAFEN) 500 MG TABLET    Take 1 tablet (500 mg total) by mouth 2 (two) times daily.    ZIPRASIDONE (GEODON) 60 MG CAP    TAKE 2 CAPSULES BY MOUTH NIGHTLY AT BEDTIME   Modified Medications    No medications on file   Discontinued Medications    No medications on file       PMH:  As per HPI and below:  Past Medical History:   Diagnosis Date    Anxiety     Bipolar 1 disorder     Depression      History reviewed. No pertinent surgical history.    Social History     Socioeconomic History    Marital status:    Tobacco Use    Smoking status: Every Day     Types: Vaping with nicotine, Vaping w/o nicotine    Smokeless tobacco: Never   Substance and Sexual Activity    Alcohol use: Yes     Comment: social    Drug use: Yes     Types: Marijuana     Social Drivers of Health     Financial Resource Strain: Low Risk  (7/29/2021)     Received from [facility]    Overall Financial Resource Strain (CARDIA)     Difficulty of Paying Living Expenses: Not very hard   Food Insecurity: Food Insecurity Present (7/29/2021)    Received from [facility]    Hunger Vital Sign     Worried About Running Out of Food in the Last Year: Sometimes true     Ran Out of Food in the Last Year: Never true   Transportation Needs: Unmet Transportation Needs (7/29/2021)    Received from [facility]    PRAPARE - Transportation     Lack of Transportation (Medical): No     Lack of Transportation (Non-Medical): Yes   Physical Activity: Sufficiently Active (7/29/2021)    Received from [facility]    Exercise Vital Sign     Days of Exercise per Week: 5 days     Minutes of Exercise per Session: 30 min   Stress: Stress Concern Present (7/29/2021)    Received from [facility] - Occupational Stress Questionnaire     Feeling of Stress : Very much   Housing Stability: Low Risk  (7/29/2021)    Received from [facility]    Housing Stability Vital Sign     Unable to Pay for Housing in the Last Year: No     Number of Places Lived in the Last Year: 2     Unstable Housing in the Last Year: No       No family history on file.    Physical Exam:    Vitals:    07/05/25 1309   BP: 122/79   Pulse: 94   Resp: 18   Temp: 99.1 °F (37.3 °C)     GENERAL:  No apparent distress.  Alert.    HEENT:  Moist mucous membranes.  Normocephalic and atraumatic.    NECK:  No swelling.  Midline trachea.   CARDIOVASCULAR:  Regular rate and rhythm.  2+ radial pulses.    PULMONARY:  Lungs clear to auscultation bilaterally.  No wheezes, rales, or rhonci.    ABDOMEN:  Non-tender and non-distended.    EXTREMITIES:  Warm and well perfused.  Brisk capillary refill.    NEUROLOGICAL:  Normal mental status.  Appropriate and conversant.    SKIN:  No rashes or ecchymoses.    PSYCH:  No suicidal ideation.  No homicidal  ideation.  Labile affect.  No clear hallucinations.  Multiple delusions.  Flight of ideas.  Poor insight into recent situation.  Some grandiosity of thought present.    Labs Reviewed   COMPREHENSIVE METABOLIC PANEL - Abnormal       Result Value    Sodium 139      Potassium 3.7      Chloride 108      CO2 30 (*)     Glucose 98      BUN 10      Creatinine 1.0      Calcium 9.9      Protein Total 8.3      Albumin 5.1      Bilirubin Total 0.8      ALP 71      AST 19      ALT 11      Anion Gap 1 (*)     eGFR >60     ACETAMINOPHEN LEVEL - Abnormal    Acetaminophen Level 0.1 (*)    SALICYLATE LEVEL - Abnormal    Salicylate Level <1.5 (*)    CBC WITH DIFFERENTIAL - Abnormal    WBC 8.49      RBC 5.76      Hgb 13.8 (*)     Hct 43.8      MCV 76 (*)     MCH 24.0 (*)     MCHC 31.5 (*)     RDW 15.1 (*)     Platelet Count 261      MPV 11.2      Nucleated RBC 0      Neut % 58.7      Lymph % 31.4      Mono % 5.7      Eos % 3.1      Basophil % 0.6      Imm Grans % 0.5      Neut # 5.0      Lymph # 2.67      Mono # 0.48      Eos # 0.26      Baso # 0.05      Imm Grans # 0.04     TSH - Normal    TSH 0.832     URINALYSIS, REFLEX TO URINE CULTURE - Normal    Color, UA Yellow      Appearance, UA Clear      Spec Grav UA 1.020      pH, UA 8.0      Protein, UA Negative      Glucose, UA Negative      Ketones, UA Negative      Blood, UA Negative      Bilirubin, UA Negative      Urobilinogen, UA Negative      Nitrites, UA Negative      Leukocyte Esterase, UA Negative     DRUG SCREEN PANEL, URINE EMERGENCY - Normal    Benzodiazepine, Urine Negative      Cocaine, Urine Negative      Opiates, Urine Negative      Barbituates, Urine Negative      Amphetamines, Urine Negative      THC Negative      Phencyclidine, Urine Negative      Urine Creatinine 173.1      Narrative:     This screen includes the following classes of drugs at the   listed cut-off:    Benzodiazepines                  200 ng/ml  Cocaine metabolite               300 ng/ml  Opiates                           300 ng/ml  Barbiturates                     200 ng/ml  Amphetamines                    1000 ng/ml  Marijuana metabs (THC)            50 ng/ml  Phencyclidine (PCP)               25 ng/ml    High concentrations of Methylenedioxymethamphetamine (MDMA aka  Ectasy) and other structurally similar compounds may cross-   react with the Amphetamine/Methamphetamine screening   immunoassay giving a false positive result.    Note: This exception list includes only more common   interferants in toxicology screen testing.  Because of many cross-reactantspositive results on toxicology drug screens   should be confirmed whenever results do not correlate with   clinical presentation.    This report is intended for use in clinical monitoring and  management of patients. It is not intended for use in   employment related drug testing.    Because of any cross-reactants, positive results on toxicology  drug screens should be confirmed whenever results do not  correlate with clinical presentation.    Presumptive positive results are unconfirmed and may be used   only for medical purposes.   ALCOHOL,MEDICAL (ETHANOL) - Normal    Alcohol, Serum <10     CBC W/ AUTO DIFFERENTIAL    Narrative:     The following orders were created for panel order CBC auto differential.  Procedure                               Abnormality         Status                     ---------                               -----------         ------                     CBC with Differential[[id number]]       Abnormal            Final result                 Please view results for these tests on the individual orders.   HEPATITIS C ANTIBODY   HEP C VIRUS HOLD SPECIMEN   HIV 1 / 2 ANTIBODY   HIV VIRUS CONFIRMATION HOLD SPECIMEN       Current Discharge Medication List        CONTINUE these medications which have NOT CHANGED    Details   buPROPion (WELLBUTRIN XL) 300 MG 24 hr tablet Take 300 mg by mouth every morning.  Refills: 2      clonazePAM (KLONOPIN)  1 MG tablet Take 2 mg by mouth 3 (three) times daily.       nabumetone (RELAFEN) 500 MG tablet Take 1 tablet (500 mg total) by mouth 2 (two) times daily.  Qty: 30 tablet, Refills: 1    Associated Diagnoses: Patellofemoral stress syndrome, unspecified laterality      ziprasidone (GEODON) 60 MG Cap TAKE 2 CAPSULES BY MOUTH NIGHTLY AT BEDTIME  Refills: 2             Orders Placed This Encounter   Procedures    Hepatitis C Antibody    HCV Virus Hold Specimen    HIV 1/2 Ag/Ab (4th Gen)    HIV Virus Confirmation Hold Specimen    CBC auto differential    Comprehensive metabolic panel    TSH    Urinalysis, Reflex to Urine Culture Urine, Clean Catch    Drug screen panel, emergency    Ethanol    Acetaminophen level    Salicylate Level    CBC with Differential    Diet Adult Regular Safety Tray    Vital signs    Undress patient and allow them to wear facility provided apparel.    Direct Psych Observation    Visitors (psych) - Allow    EKG 12-lead    Restraints Violent or Self-Destructive - Physical Hold    PEC/Judicial Commitment/Legal Status/Psych Hold - Physician's Emergency Certificate/72 Hour Psych Hold       Imaging Results    None              MDM:    30 y.o. male with encounter for OPC from home with apparent fabienne with a associated danger to himself and others. PEC in place.  Screening medical testing sent but low suspicion for alternative life-threatening medical process.  Patient is clearly manic but so far redirectable.  He has not required chemical or physical restraint.    I did perform a focused medical assessment to explore alternative etiologies relating to the patient's presentation or conditions in need of emergent stabilization in the emergency department.  None are evident.  The patient is medically cleared for transfer to facilitate further psychiatric evaluation.  PEC is in place.        Diagnoses:    1. Fabienne       Duane Sanford MD  07/05/25 1525

## 2025-07-05 NOTE — ED NOTES
Pt came out of room to nurses station verbally abusing staff. Tried therapeutic interventions, pt was not receptive to interventions. Pt was continuing to yell at staff and verbally abuse staff. MD notified. Seclusion order obtain. Pt eventually went back into room and magnet was pulled from door.   home